# Patient Record
Sex: MALE | Race: OTHER | NOT HISPANIC OR LATINO
[De-identification: names, ages, dates, MRNs, and addresses within clinical notes are randomized per-mention and may not be internally consistent; named-entity substitution may affect disease eponyms.]

---

## 2019-05-14 PROBLEM — Z00.00 ENCOUNTER FOR PREVENTIVE HEALTH EXAMINATION: Status: ACTIVE | Noted: 2019-05-14

## 2019-05-16 ENCOUNTER — APPOINTMENT (OUTPATIENT)
Dept: UROLOGY | Facility: CLINIC | Age: 29
End: 2019-05-16
Payer: MEDICAID

## 2019-05-16 VITALS — TEMPERATURE: 98.9 F | HEART RATE: 87 BPM | DIASTOLIC BLOOD PRESSURE: 78 MMHG | SYSTOLIC BLOOD PRESSURE: 144 MMHG

## 2019-05-16 DIAGNOSIS — E29.1 TESTICULAR HYPOFUNCTION: ICD-10-CM

## 2019-05-16 DIAGNOSIS — N52.9 MALE ERECTILE DYSFUNCTION, UNSPECIFIED: ICD-10-CM

## 2019-05-16 PROCEDURE — 99204 OFFICE O/P NEW MOD 45 MIN: CPT

## 2019-05-16 RX ORDER — SILDENAFIL 100 MG/1
100 TABLET, FILM COATED ORAL
Qty: 10 | Refills: 11 | Status: ACTIVE | COMMUNITY
Start: 2019-05-16 | End: 1900-01-01

## 2019-05-16 NOTE — PHYSICAL EXAM
[General Appearance - Well Developed] : well developed [General Appearance - Well Nourished] : well nourished [Normal Appearance] : normal appearance [Well Groomed] : well groomed [Heart Rate And Rhythm] : Heart rate and rhythm were normal [] : no respiratory distress [Abdomen Tenderness] : non-tender [Abdomen Soft] : soft [Abdomen Hernia] : no hernia was discovered [Costovertebral Angle Tenderness] : no ~M costovertebral angle tenderness [Urethral Meatus] : meatus normal [Penis Abnormality] : normal circumcised penis [Urinary Bladder Findings] : the bladder was normal on palpation [Scrotum] : the scrotum was normal [FreeTextEntry1] : poor valsalva. grade I-II left and right vx. normal vasa. left epididyaml head cyst.  [Normal Station and Gait] : the gait and station were normal for the patient's age [Skin Color & Pigmentation] : normal skin color and pigmentation [No Focal Deficits] : no focal deficits [Oriented To Time, Place, And Person] : oriented to person, place, and time [No Palpable Adenopathy] : no palpable adenopathy

## 2019-05-16 NOTE — HISTORY OF PRESENT ILLNESS
[FreeTextEntry1] : 28M  Alissa Westfall (26F)  7.5 years comes in for evaluation. no pregnancy to date. female facotr evaluation with possible hypothyroidism and PCOS. +ED on occasion. unclear if ejaculatory. likely ejacualtory. strong libdio. ?nocturnal emssion in the past. no voidign complaints. no hematuria. no dysuria. no labs. no family history proster kidney bladder cancer. no addtionalc ompaltins. \par no SA\par no labs

## 2019-05-16 NOTE — ASSESSMENT
[FreeTextEntry1] : hypogonadism\par T labs\par ?bilateral varicocele\par scrotal ultrasound\par semen analysis

## 2019-07-03 ENCOUNTER — CLINICAL ADVICE (OUTPATIENT)
Age: 29
End: 2019-07-03

## 2019-07-07 ENCOUNTER — FORM ENCOUNTER (OUTPATIENT)
Age: 29
End: 2019-07-07

## 2019-07-08 ENCOUNTER — OUTPATIENT (OUTPATIENT)
Dept: OUTPATIENT SERVICES | Facility: HOSPITAL | Age: 29
LOS: 1 days | End: 2019-07-08
Payer: COMMERCIAL

## 2019-07-08 ENCOUNTER — APPOINTMENT (OUTPATIENT)
Dept: ULTRASOUND IMAGING | Facility: HOSPITAL | Age: 29
End: 2019-07-08
Payer: MEDICAID

## 2019-07-08 DIAGNOSIS — E29.1 TESTICULAR HYPOFUNCTION: ICD-10-CM

## 2019-07-08 PROCEDURE — 76870 US EXAM SCROTUM: CPT

## 2019-07-08 PROCEDURE — 76870 US EXAM SCROTUM: CPT | Mod: 26

## 2019-07-08 PROCEDURE — 93975 VASCULAR STUDY: CPT

## 2019-07-08 PROCEDURE — 93976 VASCULAR STUDY: CPT | Mod: 26

## 2019-07-26 ENCOUNTER — RESULT REVIEW (OUTPATIENT)
Age: 29
End: 2019-07-26

## 2020-08-12 ENCOUNTER — APPOINTMENT (OUTPATIENT)
Dept: UROLOGY | Facility: CLINIC | Age: 30
End: 2020-08-12
Payer: MEDICAID

## 2020-08-12 VITALS
WEIGHT: 229 LBS | HEIGHT: 60 IN | SYSTOLIC BLOOD PRESSURE: 117 MMHG | DIASTOLIC BLOOD PRESSURE: 76 MMHG | HEART RATE: 78 BPM | BODY MASS INDEX: 44.96 KG/M2 | TEMPERATURE: 98.2 F

## 2020-08-12 PROCEDURE — 99212 OFFICE O/P EST SF 10 MIN: CPT

## 2020-08-12 NOTE — HISTORY OF PRESENT ILLNESS
September 26, 2018      Sandi Nix MD  [address]  [place] [address]           [facility]  [address] [place] [address] [address]  [place] [address]  Phone: [phone]          Patient: Efraín Anton   MR Number: [id number]   YOB: 1948   Date of Visit: 9/26/2018       Dear Dr. Sandi Nix:    Thank you for referring Efraín Anton to me for evaluation. Attached you will find relevant portions of my assessment and plan of care.    If you have questions, please do not hesitate to call me. I look forward to following Efraín Anton along with you.    Sincerely,    Deonte Guzmán, OD    Enclosure  CC:  No Recipients    If you would like to receive this communication electronically, please contact [email or web address] or [phone] to request more information on [employer] Link access.    For providers and/or their staff who would like to refer a patient to [facility], please contact us through our one-stop-shop provider referral line, [facility], at [phone].    If you feel you have received this communication in error or would no longer like to receive these types of communications, please e-mail [email or web address]          [FreeTextEntry1] : returns for follow up\par now seeing an endocrinologist\par completed labs and is now on some form of medication\par unclear what SA results are\par last seen 5/2019\par scrotal sonogram with 3mm left varicocele \par

## 2020-08-12 NOTE — ASSESSMENT
[FreeTextEntry1] : hypogoandsim\par left varicocele\par to send in hormonal results and semen anlaysis as well as name of medicaiton\par f/u by phone - if abnl may need to come in for repeat follow up